# Patient Record
Sex: FEMALE | Race: WHITE | Employment: OTHER | URBAN - METROPOLITAN AREA
[De-identification: names, ages, dates, MRNs, and addresses within clinical notes are randomized per-mention and may not be internally consistent; named-entity substitution may affect disease eponyms.]

---

## 2019-09-15 ENCOUNTER — HOSPITAL ENCOUNTER (OUTPATIENT)
Dept: CT IMAGING | Age: 49
Discharge: HOME | End: 2019-09-15

## 2019-09-15 ENCOUNTER — HOSPITAL ENCOUNTER (OUTPATIENT)
Dept: GENERAL RADIOLOGY | Age: 49
Discharge: HOME | End: 2019-09-15

## 2019-09-15 ENCOUNTER — HOSPITAL ENCOUNTER (EMERGENCY)
Age: 49
Discharge: HOME | End: 2019-09-15
Attending: EMERGENCY MEDICINE

## 2019-09-15 DIAGNOSIS — M54.12 CERVICAL RADICULOPATHY: ICD-10-CM

## 2019-09-15 DIAGNOSIS — R10.10 PAIN OF UPPER ABDOMEN: Primary | ICD-10-CM

## 2019-09-15 DIAGNOSIS — M54.2 NECK PAIN: ICD-10-CM

## 2019-09-15 DIAGNOSIS — K81.0 ACUTE CHOLECYSTITIS: ICD-10-CM

## 2019-09-15 DIAGNOSIS — M25.512 LEFT SHOULDER PAIN, UNSPECIFIED CHRONICITY: ICD-10-CM

## 2019-09-15 PROCEDURE — 36415 COLL VENOUS BLD VENIPUNCTURE: CPT

## 2019-09-15 PROCEDURE — 6360000004 HC RX CONTRAST MEDICATION: Performed by: PHYSICIAN ASSISTANT

## 2019-09-15 PROCEDURE — 80076 HEPATIC FUNCTION PANEL: CPT

## 2019-09-15 PROCEDURE — 74177 CT ABD & PELVIS W/CONTRAST: CPT

## 2019-09-15 PROCEDURE — 85025 COMPLETE CBC W/AUTO DIFF WBC: CPT

## 2019-09-15 PROCEDURE — 80048 BASIC METABOLIC PNL TOTAL CA: CPT

## 2019-09-15 PROCEDURE — 83690 ASSAY OF LIPASE: CPT

## 2019-09-15 PROCEDURE — 73030 X-RAY EXAM OF SHOULDER: CPT

## 2019-09-15 PROCEDURE — 72040 X-RAY EXAM NECK SPINE 2-3 VW: CPT

## 2019-09-15 PROCEDURE — 99283 EMERGENCY DEPT VISIT LOW MDM: CPT

## 2019-09-15 RX ADMIN — IOPAMIDOL 75 ML: 755 INJECTION, SOLUTION INTRAVENOUS at 19:11

## 2019-09-15 RX ADMIN — IOPAMIDOL 75 ML: 755 INJECTION, SOLUTION INTRAVENOUS at 14:44

## 2019-09-15 ASSESSMENT — ENCOUNTER SYMPTOMS
VOMITING: 1
ABDOMINAL PAIN: 1
DIARRHEA: 0
SHORTNESS OF BREATH: 0
NAUSEA: 1
CHEST TIGHTNESS: 0

## 2019-09-15 NOTE — ED PROVIDER NOTES
[facility]        Pt Name: Thad Singh  MRN: [id number]  [place] 1970  Date of evaluation: 9/15/2019  Provider: Tim Santiago PA-C  PCP: Cesilia Mccarthy MD    This patient was seen and evaluated by the attending physician Zaria Kasper, *. CHIEF COMPLAINT       No chief complaint on file. Left neck and shoulder pain, right upper quadrant pain. HISTORY OF PRESENT ILLNESS   (Location/Symptom, Timing/Onset, Context/Setting, Quality, Duration, Modifying Factors, Severity)  Note limiting factors. Thad Singh is a 46 y.o. female resents to the emergency department by emergency medical services with a couple of different complaints. Patient states that she has been having difficulties as a pertains to neck pain, left posterior shoulder pain as well as arm pain ever since July. She states that she has seen a chiropractor in a couple of different occasions for this pain and discomfort and was diagnosed as having disc disease and radicular-like symptoms. It is reported that she is having increasing levels of pain and discomfort in regards to this over the past 2 days. Patient reports that she is having numbness and tingling going down her left arm which goes into all 5 fingers. She denies any perceived weakness. She also reports that she is having difficulties with postprandial right upper quadrant pain. She states that she had been seen and evaluated by her primary care physician is supposed to have an outpatient right upper quadrant ultrasound performed next week. She states tonight she was overtaken with significant pain and discomfort. She is had associated nausea has had a few bouts of vomiting and states that the pain is currently 10 out of 10. Patient states that she has had no medication she can take in the home environment for this.   Patient goes on to report she has felt chilled but she is

## 2020-05-20 ENCOUNTER — HOSPITAL ENCOUNTER (EMERGENCY)
Age: 50
Discharge: HOME | End: 2020-05-20
Attending: EMERGENCY MEDICINE
Payer: MEDICARE

## 2020-05-20 VITALS
OXYGEN SATURATION: 93 % | WEIGHT: 162 LBS | BODY MASS INDEX: 29.81 KG/M2 | DIASTOLIC BLOOD PRESSURE: 70 MMHG | RESPIRATION RATE: 20 BRPM | SYSTOLIC BLOOD PRESSURE: 120 MMHG | TEMPERATURE: 99.6 F | HEIGHT: 62 IN | HEART RATE: 61 BPM

## 2020-05-20 RX ORDER — ATORVASTATIN CALCIUM 10 MG/1
10 TABLET, FILM COATED ORAL DAILY
COMMUNITY

## 2020-05-20 ASSESSMENT — PAIN SCALES - GENERAL: PAINLEVEL_OUTOF10: 10

## 2020-05-20 ASSESSMENT — PAIN DESCRIPTION - PAIN TYPE: TYPE: ACUTE PAIN

## 2020-05-20 ASSESSMENT — PAIN DESCRIPTION - DESCRIPTORS: DESCRIPTORS: ACHING;SHARP

## 2020-05-20 ASSESSMENT — PAIN DESCRIPTION - LOCATION: LOCATION: BACK

## 2020-05-20 NOTE — ED NOTES
RN in room pt stating that she is just going to go because no one has been in to see her yet, RN explained that she has been seen by the nurses and that the provider was with a critical pt, pt stated that she didn't want to wait, RN informed MD who states that she was next to be seen, RN explained this to pt, pt states that she was leaving she wasn't waiting another minute. Provider notified. Pt walked to waiting room, gait steady.       Emelia Arellano RN  05/20/20 1444

## 2020-05-20 NOTE — ED NOTES
Pt up to the bathroom, gait steady. Pt repostioned in bed, respirations easy,even, and unlabored. No s/s of distress. Pt is alert and orientated.       Jarrett Ferrer RN  05/20/20 0500

## 2020-05-20 NOTE — ED PROVIDER NOTES
Unfortunately this patient left the ED before I was able to perform a face-to-face evaluation of her. Therefore she is left without being seen after RN triage.       Trent Juan MD  05/20/20 4219

## 2020-11-27 ENCOUNTER — NURSE ONLY (OUTPATIENT)
Dept: PRIMARY CARE CLINIC | Age: 50
End: 2020-11-27
Payer: MEDICARE

## 2020-11-27 PROCEDURE — 99211 OFF/OP EST MAY X REQ PHY/QHP: CPT | Performed by: NURSE PRACTITIONER

## 2020-11-28 LAB — SARS-COV-2, NAA: NOT DETECTED
